# Patient Record
Sex: MALE | Race: WHITE | Employment: OTHER | URBAN - METROPOLITAN AREA
[De-identification: names, ages, dates, MRNs, and addresses within clinical notes are randomized per-mention and may not be internally consistent; named-entity substitution may affect disease eponyms.]

---

## 2017-01-01 ENCOUNTER — TRANSFERRED RECORDS (OUTPATIENT)
Dept: HEALTH INFORMATION MANAGEMENT | Facility: CLINIC | Age: 67
End: 2017-01-01

## 2017-01-01 ENCOUNTER — TEAM CONFERENCE (OUTPATIENT)
Dept: TRANSPLANT | Facility: CLINIC | Age: 67
End: 2017-01-01

## 2017-01-01 ENCOUNTER — RESULTS ONLY (OUTPATIENT)
Dept: OTHER | Facility: CLINIC | Age: 67
End: 2017-01-01

## 2017-01-01 ENCOUNTER — OFFICE VISIT (OUTPATIENT)
Dept: TRANSPLANT | Facility: CLINIC | Age: 67
End: 2017-01-01
Attending: PHYSICIAN ASSISTANT
Payer: MEDICARE

## 2017-01-01 ENCOUNTER — MEDICAL CORRESPONDENCE (OUTPATIENT)
Dept: TRANSPLANT | Facility: CLINIC | Age: 67
End: 2017-01-01

## 2017-01-01 ENCOUNTER — TELEPHONE (OUTPATIENT)
Dept: TRANSPLANT | Facility: CLINIC | Age: 67
End: 2017-01-01

## 2017-01-01 ENCOUNTER — MEDICAL CORRESPONDENCE (OUTPATIENT)
Dept: HEALTH INFORMATION MANAGEMENT | Facility: CLINIC | Age: 67
End: 2017-01-01

## 2017-01-01 VITALS
BODY MASS INDEX: 27.64 KG/M2 | RESPIRATION RATE: 16 BRPM | DIASTOLIC BLOOD PRESSURE: 77 MMHG | WEIGHT: 172 LBS | HEIGHT: 66 IN | TEMPERATURE: 97.8 F | SYSTOLIC BLOOD PRESSURE: 141 MMHG | OXYGEN SATURATION: 97 % | HEART RATE: 96 BPM

## 2017-01-01 DIAGNOSIS — Z76.82 ORGAN TRANSPLANT CANDIDATE: ICD-10-CM

## 2017-01-01 DIAGNOSIS — Z87.891 HISTORY OF TOBACCO USE: ICD-10-CM

## 2017-01-01 DIAGNOSIS — N18.6 END STAGE RENAL DISEASE (H): Primary | ICD-10-CM

## 2017-01-01 DIAGNOSIS — N18.6 END STAGE RENAL DISEASE (H): ICD-10-CM

## 2017-01-01 DIAGNOSIS — I73.9 PERIPHERAL VASCULAR DISEASE (H): ICD-10-CM

## 2017-01-01 DIAGNOSIS — Z76.82 ORGAN TRANSPLANT CANDIDATE: Primary | ICD-10-CM

## 2017-01-01 DIAGNOSIS — R09.89 BILATERAL CAROTID BRUITS: ICD-10-CM

## 2017-01-01 LAB
PRA SINGLE ANTIGEN IGG ANTIBODY: NORMAL
SA1 CELL: NORMAL
SA1 COMMENTS: NORMAL
SA1 HI RISK ABY: NORMAL
SA1 MOD RISK ABY: NORMAL
SA1 TEST METHOD: NORMAL
SA2 CELL: NORMAL
SA2 COMMENTS: NORMAL
SA2 HI RISK ABY UA: NORMAL
SA2 MOD RISK ABY: NORMAL
SA2 TEST METHOD: NORMAL
UNOS CPRA: 37

## 2017-01-01 PROCEDURE — 99211 OFF/OP EST MAY X REQ PHY/QHP: CPT

## 2017-01-01 PROCEDURE — 86833 HLA CLASS II HIGH DEFIN QUAL: CPT | Performed by: INTERNAL MEDICINE

## 2017-01-01 PROCEDURE — 86832 HLA CLASS I HIGH DEFIN QUAL: CPT | Performed by: INTERNAL MEDICINE

## 2017-01-13 ENCOUNTER — TRANSFERRED RECORDS (OUTPATIENT)
Dept: HEALTH INFORMATION MANAGEMENT | Facility: CLINIC | Age: 67
End: 2017-01-13

## 2017-01-16 ENCOUNTER — MEDICAL CORRESPONDENCE (OUTPATIENT)
Dept: TRANSPLANT | Facility: CLINIC | Age: 67
End: 2017-01-16

## 2017-03-09 ENCOUNTER — TELEPHONE (OUTPATIENT)
Dept: TRANSPLANT | Facility: CLINIC | Age: 67
End: 2017-03-09

## 2017-03-09 NOTE — TELEPHONE ENCOUNTER
Hung is going to speak to his wife about days and times that work best to come for the CT scan and he will call me back.

## 2017-04-20 ENCOUNTER — TELEPHONE (OUTPATIENT)
Dept: TRANSPLANT | Facility: CLINIC | Age: 67
End: 2017-04-20

## 2017-04-20 DIAGNOSIS — N18.6 END STAGE RENAL DISEASE (H): ICD-10-CM

## 2017-04-20 DIAGNOSIS — Z76.82 ORGAN TRANSPLANT CANDIDATE: Primary | ICD-10-CM

## 2017-04-20 NOTE — TELEPHONE ENCOUNTER
Spoke with patient about scheduling an Iliac US for Dr. Ann; patient agrees with plan. Patient mentions that he is having a new fistula placed at [facility] on 5/18/2017.

## 2017-05-02 ENCOUNTER — TRANSFERRED RECORDS (OUTPATIENT)
Dept: HEALTH INFORMATION MANAGEMENT | Facility: CLINIC | Age: 67
End: 2017-05-02

## 2017-06-13 NOTE — TELEPHONE ENCOUNTER
Coordinator returned pt's call to read CT and iliac u/s results from this year. Pt requested his assigned coordinator call him back on Thursday to review what else he needs to complete to become active. Msg sent to pt's coordinator.

## 2017-06-14 NOTE — TELEPHONE ENCOUNTER
Left message informing pt that Dr. Ann would like to see him in her clinic once she has reviewed requested notes from [facility]. Provided pt with our 's number to schedule appt or he can wait for her call to him. Encouraged patient to call with any questions or concerns.

## 2017-06-30 NOTE — PROGRESS NOTES
Assessment and Plan:  Hung Perez is a 66 year old male who presents for re-evaluation of kidney transplant candidacy.  He is blood type O, has been listed for 2.5 years (but should be able to back date to HD initiation date of 4/4/14), and does not have possible living donors.  Hung is not highly sensitized.    1. Kidney transplant wait list evaluation - currently inactive due to ongoing infections and vascular concerns.  Infections are now cleared  2. ESKD from Type 1 Diabetes  3. Cardiac:  CAD s/p CABG x 4 7/15, ICD 1/16.  Outside cardiology notes indicate current EF 50-55%, last seen by his cardiologist 10/16.  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  S/p L fem-ant tib bypass 5/16.  CT scan 3/17 shows extensive aortoiliac calcification but relative sparing of the iliacs.  Reviewed with Dr. Ann and there is a target on the right side for implantation.  5.  Cancer screening: colonoscopy 2012; PSA 8/16 0.72 per records; needs CXR (former 40 pack-year smoker)  6.  H/o osteomyelitis of lower L jaw - resolved, off antibiotics; per pt, he has been cleared by Dr. Sánchez ([facility] oral surgeon)  7.  Heterozygous Prothrombin Gene Mutation - has been seen by Hematology; they recommend pharmacologic DVT/PE prophylaxis be initiated post-op (unfractioned heparin 12-24 hours post-op)  8.  Carotid bruit - likely referred cardiac murmur, but consider carotid doppler.  Pt states he had this done recently; need records.  9.  Pulses: easily palpable femoral pulses B  10.  H/o LE ulceration - no ulcers or open wounds today.  Skin intact      Discussed the risks and benefits of a transplant, including the risk of surgery and immunosuppression medications.  Discussed organ offer details, including process of being notified, types of organs (DCD, PHS Increased risk), and expected additional waiting time prior to offer (if a candidate).  Hung NAVA  Ana overall evaluation will be discussed in the Transplant Program's regular meeting with a final recommendation on the patients suitability for transplant to be made at that time.    Reason for Visit:  Hung Perez is a 66 year old male with Type 1 Diabetes, who presents for kidney transplant wait list evaluation.    Last Evaluation Clinic Visit Date:  8/30/16 Wait List Date:  11/18/14 (though may be able to retro activate to HD start date of 4/4/14) Active: No    Previous Medical Issues (Copied from last evaluation visit):  See 8/30/16 note from nephrology    HPI:         Kidney Disease Hx:        Kidney Disease Dx: Type 1 DM       Biopsy Proven: No         On Dialysis: Yes, Date initiated: 4/4/14         Previous Transplant Hx:       No         Uremic Symptoms:       No uremic symptoms         Potential Donor(s): No    ROS:  A comprehensive review of systems was obtained and negative, except as noted in the HPI or PMH.  Specifically, he denies SOB or THORPE    PMH:   Past Medical History:   Diagnosis Date     Anemia in CKD (chronic kidney disease)      CAD S/P percutaneous coronary angioplasty 2009    S/P Coronary Artery Stent      CKD (chronic kidney disease) stage 4, GFR 15-29 ml/min (H)      Diabetes mellitus type 1 (H) 1941    Diagnosed at age 9 yrs old      End stage kidney disease (H)      Former tobacco use      History of smoking 25-50 pack years     Quit 2009     HTN (hypertension)      Hyperlipidemia      Osteomyelitis of mandible 06/23/2015     PVD (peripheral vascular disease) (H) 2003    S/P Carotid Stent      Secondary hyperparathyroidism (H)      Urethral stricture 2015    Fossa navicularis stricture     Urinary retention        PSH:   Past Surgical History:   Procedure Laterality Date     AMPUTATION Left 12/2/2015    Toes gangrene     amputation Right 01/13/2017     BYPASS GRAFT FEMOROTIBIAL Right 05/19/2016    Done with toe amputations.     CORONARY ARTERY BYPASS  07/2013/2015  "      Personal Hx:   Living arrangements - the patient lives with their spouse    Hung  reports that he quit smoking about 7 years ago. His smoking use included Cigarettes. He started smoking about 51 years ago. He smoked 0.75 packs per day. He has never used smokeless tobacco. 40 pack year history.  Rarely consumes alcohol.    Allergies:  Allergies   Allergen Reactions     Oxycodone Nausea     Other reaction(s): Dizziness     Levofloxacin      Other reaction(s): Confusion, Dizziness       Medications:  Current Outpatient Prescriptions   Medication Sig Dispense Refill     MIDODRINE HCL PO        Gabapentin (NEURONTIN PO)        Acetaminophen (TYLENOL PO) Take 325 mg by mouth every 4 hours as needed for mild pain or fever       AMLODIPINE BESYLATE PO Take 5 mg by mouth daily       CALCITRIOL PO Take 0.5 mcg by mouth daily       cholecalciferol (PA VITAMIN D-3) 2000 UNITS CAPS Take 2,000 Units by mouth daily       metoprolol (TOPROL-XL) 50 MG 24 hr tablet Take 75 mg by mouth daily       aspirin 325 MG tablet Take 325 mg by mouth daily       clopidogrel (PLAVIX) 75 MG tablet Take 1 tablet by mouth daily       losartan (COZAAR) 100 MG tablet Take 100 mg by mouth daily       bumetanide (BUMEX) 1 MG tablet Take 1 mg by mouth daily       tamsulosin (FLOMAX) 0.4 MG 24 hr capsule Take 0.8 mg by mouth daily       atorvastatin (LIPITOR) 20 MG tablet Take 20 mg by mouth daily       sodium bicarbonate 650 MG tablet Take 3 tablets by mouth 2 times daily       insulin aspart (NOVOLOG VIAL) 100 UNITS/ML soln Via pump 60 units per day or as directed       nitroglycerin (NITROSTAT) 0.4 MG SL tablet Place 0.4 mg under the tongue as needed         Vitals:  /77  Pulse 96  Temp 97.8  F (36.6  C) (Oral)  Resp 16  Ht 1.676 m (5' 6\")  Wt 78 kg (172 lb)  SpO2 97%  BMI 27.76 kg/m2    Exam:  GENERAL APPEARANCE: alert and no distress  HENT: mouth without ulcers or lesions  LYMPHATICS: no cervical or supraclavicular nodes  RESP: " lungs clear to auscultation - no rales, rhonchi or wheezes  CV: regular rhythm, normal rate, no rub, HUGH, carotids w bruit bilaterally - ? Referred murmur   EDEMA: no LE edema bilaterally  ABDOMEN: soft, nondistended, nontender, bowel sounds normal  EXTREMITIES:  R BKA.  L foot without any ulcerations, 4th and 5th digits amputated  PULSES:  Easily palpable femoral pulses B  MS: extremities normal - no gross deformities noted, no evidence of inflammation in joints, no muscle tenderness  SKIN: no rash    Results:     Imaging  No results found for this or any previous visit (from the past 8760 hour(s)).      Shayna Owusu PA-C, MMS, MPH

## 2017-06-30 NOTE — NURSING NOTE
"Chief Complaint   Patient presents with     Transplant Waitlist Maintenance     Kidney       Initial /77  Pulse 96  Temp 97.8  F (36.6  C) (Oral)  Resp 16  Ht 1.676 m (5' 6\")  Wt 78 kg (172 lb)  SpO2 97%  BMI 27.76 kg/m2 Estimated body mass index is 27.76 kg/(m^2) as calculated from the following:    Height as of this encounter: 1.676 m (5' 6\").    Weight as of this encounter: 78 kg (172 lb).  Medication Reconciliation: complete    "

## 2017-06-30 NOTE — MR AVS SNAPSHOT
"              After Visit Summary   6/30/2017    Hung Perez    MRN: [id number]           Patient Information     Date Of Birth          1950        Visit Information        Provider Department      6/30/2017 7:30 AM Shayna Owusu PA-C [facility] Solid [facility] Transplant        Today's Diagnoses     End stage renal disease (H)    -  1    Peripheral vascular disease (H)        History of tobacco use        Bilateral carotid bruits           Follow-ups after your visit        Follow-up notes from your care team     Return if symptoms worsen or fail to improve.      Future tests that were ordered for you today     Open Future Orders        Priority Expected Expires Ordered    X-ray Chest 2 vws* Routine 6/30/2017 6/30/2018 6/30/2017    US carotid comp bilat (vascular lab) Routine  6/30/2018 6/30/2017            Who to contact     If you have questions or need follow up information about today's clinic visit or your schedule please contact [facility] directly at [phone].  Normal or non-critical lab and imaging results will be communicated to you by [employer], letter or phone within 4 business days after the clinic has received the results. If you do not hear from us within 7 days, please contact the clinic through [employer] or phone. If you have a critical or abnormal lab result, we will notify you by phone as soon as possible.  Submit refill requests through [employer] or call your pharmacy and they will forward the refill request to us. Please allow 3 business days for your refill to be completed.          Additional Information About Your Visit        Outerstuffhart Information     [employer] lets you send messages to your doctor, view your test results, renew your prescriptions, schedule appointments and more. To sign up, go to [email or web address] . Click on \"Log in\" on the left side of the screen, which will take you to the Welcome page. Then click on \"Sign up Now\" on the right side " "of the page.     You will be asked to enter the access code listed below, as well as some personal information. Please follow the directions to create your username and password.     Your access code is: [email or web address]  Expires: 2017  6:30 AM     Your access code will  in 90 days. If you need help or a new code, please call your [place] clinic or [phone].        Care EveryWhere ID     This is your Care EveryWhere ID. This could be used by other organizations to access your [place] medical records  [id number]        Your Vitals Were     Pulse Temperature Respirations Height Pulse Oximetry BMI (Body Mass Index)    96 97.8  F (36.6  C) (Oral) 16 1.676 m (5' 6\") 97% 27.76 kg/m2       Blood Pressure from Last 3 Encounters:   17 141/77   16 147/78   16 146/78    Weight from Last 3 Encounters:   17 78 kg (172 lb)   16 78.4 kg (172 lb 14.4 oz)   16 78.3 kg (172 lb 9.6 oz)               Primary Care Provider Office Phone # Fax #    Alonso Rodriguez [phone]       [facility] [address] [address]        Equal Access to Services     FRED GOMEZ : Hadii aad ku hadasho Soomaali, waaxda luqadaha, qaybta kaalmada adeegyada, melvina zelaya. So [facility] [phone].    ATENCIÓN: Si habla español, tiene a peguero disposición servicios gratuitos de asistencia lingüística. Sang al [phone].    We comply with applicable federal civil rights laws and [place] laws. We do not discriminate on the basis of race, color, national origin, age, disability sex, sexual orientation or gender identity.            Thank you!     Thank you for choosing [facility]PL[facility]  for your care. Our goal is always to provide you with excellent care. Hearing back from our patients is one way we can continue to improve our services. Please take a few minutes to complete the written survey that you may receive in the mail " after your visit with us. Thank you!             Your Updated Medication List - Protect others around you: Learn how to safely use, store and throw away your medicines at www.disposemymeds.org.          This list is accurate as of: 6/30/17  9:07 AM.  Always use your most recent med list.                   Brand Name Dispense Instructions for use Diagnosis    AMLODIPINE BESYLATE PO      Take 5 mg by mouth daily        aspirin 325 MG tablet      Take 325 mg by mouth daily        bumetanide 1 MG tablet    BUMEX     Take 1 mg by mouth daily        CALCITRIOL PO      Take 0.5 mcg by mouth daily        clopidogrel 75 MG tablet    PLAVIX     Take 1 tablet by mouth daily        LIPITOR 20 MG tablet   Generic drug:  atorvastatin      Take 20 mg by mouth daily        losartan 100 MG tablet    COZAAR     Take 100 mg by mouth daily        metoprolol 50 MG 24 hr tablet    TOPROL-XL     Take 75 mg by mouth daily        MIDODRINE HCL PO           NEURONTIN PO           nitroGLYcerin 0.4 MG sublingual tablet    NITROSTAT     Place 0.4 mg under the tongue as needed        NovoLOG VIAL 100 UNITS/ML injection   Generic drug:  insulin aspart      Via pump 60 units per day or as directed        PA VITAMIN D-3 2000 UNITS Caps   Generic drug:  cholecalciferol      Take 2,000 Units by mouth daily        sodium bicarbonate 650 MG tablet      Take 3 tablets by mouth 2 times daily        tamsulosin 0.4 MG capsule    FLOMAX     Take 0.8 mg by mouth daily        TYLENOL PO      Take 325 mg by mouth every 4 hours as needed for mild pain or fever

## 2017-07-05 NOTE — TELEPHONE ENCOUNTER
TEAM CONF:    ATTENDEES:  Lakeisha Guo Matas, [place], [place], [place] Coordinators, Social Workers, Pharmacists, Dieticians    DISCUSSION and OUTCOME: Needs evaluation of carotid bruit and also cardiology clearance. Will request the carotid ultrasound and Shayna will call Dr. Espinoza.       [employer] listing date matches EPIC and was done prior to the start of his dialysis date.

## 2017-07-13 NOTE — TELEPHONE ENCOUNTER
Call returned to Hung. We are waiting for Dr. Espinoza to call Shayna back with comments on how to proceed. Hung is willing to call the cardiology office if that is helpful. I said I would let him know about that, and whatever was decided, as soon as possible.

## 2017-08-01 NOTE — TELEPHONE ENCOUNTER
Mr. Perez called asking if he is active yet; it was his understanding that Shayna has rec'd everything she needed to make him active. Patient is asking for a call back with status on Thursday this week.

## 2017-08-08 NOTE — TELEPHONE ENCOUNTER
Patient called to say that he completed his L LE angioplasty a week+ ago at [facility], that he had a L LE Ultrasound completed today, and that he saw a vascluar surgeon, Dr. Gannon at [place] today as well. Patient understand that our team needs to see all of these reports, and images, and that his cardiologist needs to send a note saying he is cleared for transplant surgery. Records will be requested as a rush today.

## 2017-08-16 NOTE — TELEPHONE ENCOUNTER
Left message with Dr. Espinoza's nurse asking if patient has been seen recently and/or if he has any upcoming RTC appts; needs to see Dr. Espinoza prior to status change to Active Status per Shayna Owusu' conversation with Dr. Espinoza.

## 2017-08-17 NOTE — TELEPHONE ENCOUNTER
I called pt to f/u on cardiac testing/f/u with Dr. Espinoza.  I had called Dr. Hudson to request cardiac clearance for surgery but have not received any f/u.  Pt reports he is scheduled on 9/12 for a stress test and 9/19 for results of the stress.  I asked pt to have his cardiologist fax the note to Sita MARI LPN (pt has the fax number) and then f/u with us by calling the next day to ensure we got the note.  This should be the last thing pt needs prior to being reactivated.  Pt voiced understanding.

## 2017-10-24 NOTE — TELEPHONE ENCOUNTER
Patient called to see if we had rec's a letter from Dr. Espinoza's office saying he could proceed with transplant. I informed him that we had not but that I would request a note from Dr. Espinoza's office today and then have Ginger Talavera review as soon as we got something back from his office. Patient verbalized understanding and has no further questions at this time.

## 2017-11-02 NOTE — TELEPHONE ENCOUNTER
Left message on home and cell phone numbers, will make active. Cardiology has approved his recent heart cath.

## 2017-11-02 NOTE — LETTER
2017    Hung Juáreztanvir  [address]  [place] [address]      Dear Mr. Perez,    This letter is sent to confirm that you have completed your transplant work-up and you are a candidate in the kidney transplant program at the [facility].  You were placed on the kidney active waitlist on 2017.      When you are active on the waitlist and an organ becomes available, a coordinator will need to speak to you immediately.  You could be contacted at any time during the day or night as an organ could become available at any time.  Please make certain our office always has your current telephone numbers and address.      Items we will need from you:      We have received approval from your insurance company for the transplant procedure.  It is critical that you notify us if there is any change in your insurance.  It is also important that you familiarize yourself with the details of your specific insurance policy.  Our patient  is available to assist you if you should have any questions regarding your coverage.      An ALA or PRA blood sample may need to be sent here every 3 to 6 months to match you with  donors or any potential living donors.  If you need this testing, special mailing boxes (called mailers) will be sent to you directly from the Outreach Department.  You should take the physician order form and the  to your home laboratory when it is time to for this testing to be done.  Additional mailers can be obtained by calling the Transplant Office and asking to speak to a kidney .      During this waiting period, we may request additional periodic laboratory tests with your primary physician.  It will be your responsibility to remind your physician to forward your results to the Transplant Office.      We need to be kept informed of any changes in your medical condition such as:    o changes in your  medications,   o significant changes in your health  o significant infections (such as pneumonia or abscesses)  o blood transfusions  o any condition which requires hospitalization  o any surgery      Remember to complete any routine cancer screening tests required before your transplant.  This includes colonoscopy; prostate screening for men, and mammogram and gynecologic testing for women, as well as dental work.  Your primary care clinic can assist you with arranging for these exams.  Remind your caregivers to forward copies of the records and final reports.    We want you to know that our program has physician and surgeon coverage 24 hours a day, 365 days a year. If this coverage changes or there are substantial program changes, you will be notified in writing by letter.     Attached is a letter from the [employer] ([employer]). It describes the services and information offered to patients by UNOS and the Organ Procurement and Transplantation Network.    We appreciate having had the opportunity to participate in your care.  If you have questions, please feel free to call the Transplant Office at [phone] or [phone].      Sincerely,       Kidney Transplant Program    Enclosures: Telephone Contact List, UNOS Letter and Waitlist Information Update  CC:   Swapna Samaniego,[employer], D[facility])

## 2017-11-08 NOTE — TELEPHONE ENCOUNTER
Team Conference:      Attendees:Dr. Swanson, Dr. Cavazos, Dr. Guo, Coordinator, , Dietician, Pharmacy    Discussion and Outcome: Patient status changed to active approved. Status changed per Cardiac clearance.

## 2018-01-01 ENCOUNTER — RESULTS ONLY (OUTPATIENT)
Dept: OTHER | Facility: CLINIC | Age: 68
End: 2018-01-01

## 2018-01-01 DIAGNOSIS — N18.6 END STAGE RENAL DISEASE (H): ICD-10-CM

## 2018-01-01 DIAGNOSIS — Z76.82 ORGAN TRANSPLANT CANDIDATE: ICD-10-CM

## 2018-01-01 LAB
PRA SINGLE ANTIGEN IGG ANTIBODY: NORMAL
SA1 CELL: NORMAL
SA1 COMMENTS: NORMAL
SA1 HI RISK ABY: NORMAL
SA1 MOD RISK ABY: NORMAL
SA1 TEST METHOD: NORMAL
SA2 CELL: NORMAL
SA2 COMMENTS: NORMAL
SA2 HI RISK ABY UA: NORMAL
SA2 MOD RISK ABY: NORMAL
SA2 TEST METHOD: NORMAL

## 2018-06-08 DIAGNOSIS — N18.6 END STAGE RENAL DISEASE (H): ICD-10-CM

## 2018-06-08 DIAGNOSIS — Z12.5 PROSTATE CANCER SCREENING: ICD-10-CM

## 2018-06-08 DIAGNOSIS — Z76.82 ORGAN TRANSPLANT CANDIDATE: Primary | ICD-10-CM
